# Patient Record
Sex: MALE | Race: WHITE | NOT HISPANIC OR LATINO | Employment: UNEMPLOYED | URBAN - METROPOLITAN AREA
[De-identification: names, ages, dates, MRNs, and addresses within clinical notes are randomized per-mention and may not be internally consistent; named-entity substitution may affect disease eponyms.]

---

## 2018-05-06 ENCOUNTER — HOSPITAL ENCOUNTER (INPATIENT)
Facility: HOSPITAL | Age: 18
LOS: 3 days | Discharge: HOME | DRG: 885 | End: 2018-05-09
Attending: PSYCHIATRY & NEUROLOGY | Admitting: PSYCHIATRY & NEUROLOGY
Payer: MEDICAID

## 2018-05-06 DIAGNOSIS — F31.32 BIPOLAR AFFECTIVE DISORDER, CURRENTLY DEPRESSED, MODERATE: Primary | Chronic | ICD-10-CM

## 2018-05-06 DIAGNOSIS — R45.851 DEPRESSION WITH SUICIDAL IDEATION: ICD-10-CM

## 2018-05-06 DIAGNOSIS — F32.A DEPRESSION WITH SUICIDAL IDEATION: ICD-10-CM

## 2018-05-06 DIAGNOSIS — F32.A DEPRESSION, UNSPECIFIED DEPRESSION TYPE: ICD-10-CM

## 2018-05-06 LAB
CHOLEST SERPL-MCNC: 160 MG/DL
CHOLEST/HDLC SERPL: 4.7 {RATIO}
GLUCOSE SERPL-MCNC: 89 MG/DL
HDLC SERPL-MCNC: 34 MG/DL
HDLC SERPL: 21.3 %
LDLC SERPL CALC-MCNC: 94.4 MG/DL
NONHDLC SERPL-MCNC: 126 MG/DL
TRIGL SERPL-MCNC: 158 MG/DL

## 2018-05-06 PROCEDURE — 99223 1ST HOSP IP/OBS HIGH 75: CPT | Mod: AF,HA,, | Performed by: PSYCHIATRY & NEUROLOGY

## 2018-05-06 PROCEDURE — 99213 OFFICE O/P EST LOW 20 MIN: CPT | Mod: ,,, | Performed by: FAMILY MEDICINE

## 2018-05-06 PROCEDURE — 90833 PSYTX W PT W E/M 30 MIN: CPT | Mod: AF,HA,, | Performed by: PSYCHIATRY & NEUROLOGY

## 2018-05-06 PROCEDURE — 82947 ASSAY GLUCOSE BLOOD QUANT: CPT

## 2018-05-06 PROCEDURE — 11400000 HC PSYCH PRIVATE ROOM

## 2018-05-06 PROCEDURE — 36415 COLL VENOUS BLD VENIPUNCTURE: CPT

## 2018-05-06 PROCEDURE — 80061 LIPID PANEL: CPT

## 2018-05-06 PROCEDURE — 25000003 PHARM REV CODE 250: Performed by: PSYCHIATRY & NEUROLOGY

## 2018-05-06 RX ORDER — TRAZODONE HYDROCHLORIDE 100 MG/1
100 TABLET ORAL NIGHTLY
Status: DISCONTINUED | OUTPATIENT
Start: 2018-05-06 | End: 2018-05-07

## 2018-05-06 RX ORDER — ACETAMINOPHEN 325 MG/1
650 TABLET ORAL EVERY 6 HOURS PRN
Status: DISCONTINUED | OUTPATIENT
Start: 2018-05-06 | End: 2018-05-09 | Stop reason: HOSPADM

## 2018-05-06 RX ORDER — LAMOTRIGINE 25 MG/1
50 TABLET ORAL 2 TIMES DAILY
Status: ON HOLD | COMMUNITY
End: 2018-05-09 | Stop reason: HOSPADM

## 2018-05-06 RX ORDER — OLANZAPINE 10 MG/1
10 TABLET ORAL EVERY 8 HOURS PRN
Status: DISCONTINUED | OUTPATIENT
Start: 2018-05-06 | End: 2018-05-09 | Stop reason: HOSPADM

## 2018-05-06 RX ORDER — RISPERIDONE 1 MG/1
1.5 TABLET ORAL NIGHTLY
Status: ON HOLD | COMMUNITY
End: 2018-05-09 | Stop reason: HOSPADM

## 2018-05-06 RX ORDER — LOPERAMIDE HYDROCHLORIDE 2 MG/1
2 CAPSULE ORAL
Status: DISCONTINUED | OUTPATIENT
Start: 2018-05-06 | End: 2018-05-09 | Stop reason: HOSPADM

## 2018-05-06 RX ORDER — DOCUSATE SODIUM 100 MG/1
100 CAPSULE, LIQUID FILLED ORAL DAILY PRN
Status: DISCONTINUED | OUTPATIENT
Start: 2018-05-06 | End: 2018-05-09 | Stop reason: HOSPADM

## 2018-05-06 RX ORDER — FOLIC ACID 1 MG/1
1 TABLET ORAL DAILY
Status: DISCONTINUED | OUTPATIENT
Start: 2018-05-06 | End: 2018-05-09 | Stop reason: HOSPADM

## 2018-05-06 RX ORDER — HYDROXYZINE PAMOATE 50 MG/1
50 CAPSULE ORAL NIGHTLY PRN
Status: DISCONTINUED | OUTPATIENT
Start: 2018-05-06 | End: 2018-05-09 | Stop reason: HOSPADM

## 2018-05-06 RX ORDER — OLANZAPINE 10 MG/2ML
10 INJECTION, POWDER, FOR SOLUTION INTRAMUSCULAR EVERY 8 HOURS PRN
Status: DISCONTINUED | OUTPATIENT
Start: 2018-05-06 | End: 2018-05-09 | Stop reason: HOSPADM

## 2018-05-06 RX ORDER — LAMOTRIGINE 100 MG/1
100 TABLET ORAL DAILY
Status: DISCONTINUED | OUTPATIENT
Start: 2018-05-06 | End: 2018-05-09 | Stop reason: HOSPADM

## 2018-05-06 RX ORDER — RISPERIDONE 2 MG/1
2 TABLET ORAL NIGHTLY
Status: DISCONTINUED | OUTPATIENT
Start: 2018-05-06 | End: 2018-05-08

## 2018-05-06 RX ORDER — MAG HYDROX/ALUMINUM HYD/SIMETH 200-200-20
30 SUSPENSION, ORAL (FINAL DOSE FORM) ORAL EVERY 6 HOURS PRN
Status: DISCONTINUED | OUTPATIENT
Start: 2018-05-06 | End: 2018-05-09 | Stop reason: HOSPADM

## 2018-05-06 RX ORDER — ESCITALOPRAM OXALATE 10 MG/1
10 TABLET ORAL DAILY
Status: DISCONTINUED | OUTPATIENT
Start: 2018-05-06 | End: 2018-05-09 | Stop reason: HOSPADM

## 2018-05-06 RX ORDER — TRAZODONE HYDROCHLORIDE 50 MG/1
50 TABLET ORAL NIGHTLY
Status: ON HOLD | COMMUNITY
End: 2018-05-09 | Stop reason: HOSPADM

## 2018-05-06 RX ADMIN — HYDROXYZINE PAMOATE 50 MG: 50 CAPSULE ORAL at 01:05

## 2018-05-06 RX ADMIN — FOLIC ACID 1 MG: 1 TABLET ORAL at 08:05

## 2018-05-06 RX ADMIN — THERA TABS 1 TABLET: TAB at 08:05

## 2018-05-06 RX ADMIN — LAMOTRIGINE 100 MG: 100 TABLET ORAL at 11:05

## 2018-05-06 RX ADMIN — RISPERIDONE 2 MG: 2 TABLET ORAL at 08:05

## 2018-05-06 RX ADMIN — TRAZODONE HYDROCHLORIDE 100 MG: 100 TABLET ORAL at 08:05

## 2018-05-06 RX ADMIN — ESCITALOPRAM 10 MG: 10 TABLET, FILM COATED ORAL at 11:05

## 2018-05-06 NOTE — H&P
[facility] Medicine  History & Physical    Patient Name: Caio Villa  MRN: [id number]  Admission Date: 5/6/2018  Attending Physician: Neal Jackson MD   Primary Care Provider: Dimas Garcia MD         Patient information was obtained from patient and ER records.     History & Physical      SUBJECTIVE:     History of Present Illness:  Patient is a 17 y.o. male presents with depression and suicidal ideations. Admitted to [facility].    No past medical history other than psych. No complaints today.    PTA Medications   Medication Sig    lamoTRIgine (LAMICTAL) 25 MG tablet Take 50 mg by mouth 2 (two) times daily.    risperiDONE (RISPERDAL) 1 MG tablet Take 1.5 mg by mouth every evening.    traZODone (DESYREL) 50 MG tablet Take 50 mg by mouth every evening.       Review of patient's allergies indicates:  No Known Allergies    Past Medical History:   Diagnosis Date    Anxiety     Bipolar disorder     Depression     History of psychiatric hospitalization     Hx of psychiatric care     Psychiatric problem     Suicide attempt     Therapy      No past surgical history on file.  Family History   Problem Relation Age of Onset    Drug abuse Mother     Drug abuse Father      Social History   Substance Use Topics    Smoking status: Never Smoker    Smokeless tobacco: Never Used    Alcohol use No        Review of Systems:  Constitutional: no fever or chills  Respiratory: no cough or shorness of breath  Cardiovascular: no chest pain or palpitations  Gastrointestinal: no nausea or vomiting, no abdominal pain or change in bowel habits  Musculoskeletal: no arthralgias or myalgias  Psych: depressed  OBJECTIVE:     Vital Signs (Most Recent)  Temp: 96.8 °F (36 °C) (05/06/18 0759)  Pulse: 77 (05/06/18 0759)  Resp: 16 (05/06/18 0759)  BP: (!) 103/55 (05/06/18 0759)    Physical Exam:  General: well developed, well nourished  Lungs:  clear to auscultation bilaterally and normal respiratory  effort  Cardiovascular: Heart: regular rate and rhythm, S1, S2 normal, no murmur, click, rub or gallop. Chest Wall: no tenderness. Extremities: no cyanosis or edema, or clubbing. Pulses: 2+ and symmetric.  Abdomen/Rectal: Abdomen: soft, non-tender non-distented; bowel sounds normal; no masses,  no organomegaly. Rectal: not examined  Skin: Skin color, texture, turgor normal. No rashes or lesions  Musculoskeletal:normal gait  Psych: very flat affect. Will not make eye contact  Neuro: Cranial nerves:  CN II Visual fields full to confrontation.   CN III, IV, VI Pupils are equal, round, and reactive to light.  CN III: no palsy  Nystagmus: none   Diplopia: none  Ophthalmoparesis: none  CN V Facial sensation intact.   CN VII Facial expression full, symmetric.   CN VIII normal.   CN IX normal.   CN X normal.   CN XI normal.   CN XII normal.        Laboratory  CBC: No results for input(s): WBC, RBC, HGB, HCT, PLT, MCV, MCH, MCHC in the last 168 hours.  CMP: No results for input(s): GLU, CALCIUM, ALBUMIN, PROT, NA, K, CO2, CL, BUN, CREATININE, ALKPHOS, ALT, AST, BILITOT in the last 168 hours.  No results for input(s): COLORU, CLARITYU, SPECGRAV, PHUR, PROTEINUA, GLUCOSEU, BILIRUBINCON, BLOODU, WBCU, RBCU, BACTERIA, MUCUS, NITRITE, LEUKOCYTESUR, UROBILINOGEN, HYALINECASTS in the last 168 hours.  No results found for: TSH  No results found for this or any previous visit.  No results found for: ALCOHOLMEDIC  No results found for: LABACET  No results found for this or any previous visit.  No results found for this or any previous visit.  No results found for: PREGTESTUR    ASSESSMENT/PLAN:     Active Hospital Problems    Diagnosis  POA    Depression with suicidal ideation [F32.9, R45.851]  Not Applicable      Resolved Hospital Problems    Diagnosis Date Resolved POA   No resolved problems to display.       Plan: Further orders for psych      Assessment/Plan:     Depression with suicidal ideation    Orders per psyche             VTE Risk Mitigation     None             Layla Crawley MD  Department of Hospital Medicine   [facility]

## 2018-05-06 NOTE — PLAN OF CARE
Problem: Patient Care Overview (Adult)  Goal: Plan of Care Review  Outcome: Ongoing (interventions implemented as appropriate)  Pt isolated to room most of shift, but out for snacks and meals. VSS. Pt voices depression, but denies SI/HI, AH/VH. Medication compliant. Pt is quiet and soft spoken. Will continue to monitor pt.
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
## 2018-05-06 NOTE — PLAN OF CARE
Problem: Overarching Goals (Adult)  Goal: Optimized Coping Skills in Response to Life Stressors    Intervention: Promote Effective Coping Strategies  Patient missed group psychotherapy because he was meeting with his psychiatrist at the time.
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
## 2018-05-07 PROCEDURE — 99233 SBSQ HOSP IP/OBS HIGH 50: CPT | Mod: AF,HA,, | Performed by: PSYCHIATRY & NEUROLOGY

## 2018-05-07 PROCEDURE — 25000003 PHARM REV CODE 250: Performed by: PSYCHIATRY & NEUROLOGY

## 2018-05-07 PROCEDURE — 11400000 HC PSYCH PRIVATE ROOM

## 2018-05-07 PROCEDURE — 90833 PSYTX W PT W E/M 30 MIN: CPT | Mod: AF,HA,, | Performed by: PSYCHIATRY & NEUROLOGY

## 2018-05-07 RX ORDER — TRAZODONE HYDROCHLORIDE 150 MG/1
150 TABLET ORAL NIGHTLY
Status: DISCONTINUED | OUTPATIENT
Start: 2018-05-07 | End: 2018-05-09 | Stop reason: HOSPADM

## 2018-05-07 RX ADMIN — LAMOTRIGINE 100 MG: 100 TABLET ORAL at 08:05

## 2018-05-07 RX ADMIN — TRAZODONE HYDROCHLORIDE 150 MG: 150 TABLET ORAL at 08:05

## 2018-05-07 RX ADMIN — RISPERIDONE 2 MG: 2 TABLET ORAL at 08:05

## 2018-05-07 RX ADMIN — THERA TABS 1 TABLET: TAB at 08:05

## 2018-05-07 RX ADMIN — ESCITALOPRAM 10 MG: 10 TABLET, FILM COATED ORAL at 08:05

## 2018-05-07 RX ADMIN — FOLIC ACID 1 MG: 1 TABLET ORAL at 08:05

## 2018-05-07 NOTE — PLAN OF CARE
"Problem: Patient Care Overview (Adult)  Goal: Interdisciplinary Rounds/Family Conf  TREATMENT TEAM   Chief Complaint:  Caio Villa is a 17 y.o. male with a past psychiatric history of Bipolar 1 with psychotic features, MONA, currently admitted to the inpatient unit with the following chief complaint: "I just kind of had a breakdown"    Current:  patient attended Treatment Team dressed in personal clothes. Unkept hair. Very soft spoken. Feel like I was really stressed out and had a breakdown.  States he was upset and wanted to be left alone and argued with boyfriend they have been together 6 months.  Patient states his suicidal thoughts are getting better (I"d rather be dead than be like this). Reports previous attempt (Tried to smother myself with a pillow). States he was hearing a voice giving orders.  Has not heard any since he's been in the hospitalsital. Reports cutting when he was younger. Last timea few months ago. State he was stressed out with work. States he has had ness before where he hasn't slept for days.   States he has had problems falling asleep. Medications: Trazadone,     Plan:  D/C to home   [facility] (states he has been to the mental health center there).           "

## 2018-05-07 NOTE — PLAN OF CARE
Problem: Patient Care Overview (Adult)  Goal: Plan of Care Review  Outcome: Ongoing (interventions implemented as appropriate)  Shift note : patient is cooperative . He is unkempt . He states that he is depressed and stressed out . He and boyfriend had a fight and broke up . He is attending all groups and is taking all ordered medications .

## 2018-05-07 NOTE — PLAN OF CARE
"Problem: Patient Care Overview (Adult)  Goal: Plan of Care Review  Outcome: Ongoing (interventions implemented as appropriate)  Pt has been out of room sitting by self in dayroom reading.  Quiet, blunted, low voice.  No behavior problems.  Denies SI/HI/hallucinations.  Rates depression 2/10 and anxiety 4/10.  Oriented x 4.  States he talked to his boyfriend today and he will not be returning to live there.  He plans on moving in with his sister.  He talked on phone to his mother.  States his mood is "fine."  Remains guarded with minimal interaction.  Pt says he feels ready for discharge.  Will continue to monitor for safety      "

## 2018-05-07 NOTE — PROGRESS NOTES
"PSYCHIATRY DAILY INPATIENT PROGRESS NOTE  SUBSEQUENT HOSPITAL VISIT    ENCOUNTER DATE: 5/7/2018  SITE: [facility]    DATE OF ADMISSION: 5/6/2018 12:51 AM  LENGTH OF STAY: 1 days      HISTORY    CHIEF COMPLAINT   Caio Villa is a 17 y.o. male, seen during daily [place] rounds on the inpatient unit.  Caio Villa presents with the chief complaint of  "I just kind of had a breakdown"    HPI   (Elements: Location, Quality, Severity, Duration, Timing, Content, Modifying Factors, Associated Signs & Symptoms)    The patient was seen and examined. The chart was reviewed.    Staff reports no behavioral or management issues.     The patient has been compliant with treatment. The patient denied any side effects.    Continued Symptoms of Depression: +diminished mood, +loss of interest/anhedonia; +irritability, no diminished energy, +change in sleep, no change in appetite, no diminished concentration or cognition or indecisiveness, no PMA, +PMR, +excessive guilt or hopelessness or worthlessness, +suicidal ideations     Less trouble with Sleep: less trouble with initiation/maintenance, no early morning awakening with inability to return to sleep; Sleeping about 8.5 hours last night     Continued Suicidal/Homicidal ideations: less active/passive ideations, no organized plans, no future intentions; +history of cutting (non-suicidal self-harm) when stressed)     Denied Symptoms of psychosis: no hallucinations, delusions, disorganized thinking, disorganized behavior or abnormal motor behavior, or negative symptoms; no AH in the last 24 hours; his recent AH were likely "micro-psychotic in nature" and only occur when stressed      Denied current Symptoms of ness or hypomania: no elevated, expansive, or irritable mood with no increased energy or activity; with no inflated self-esteem or grandiosity, decreased need for sleep, increased rate of speech, FOI or racing thoughts, distractibility, increased goal directed activity or " PMA, or risky/disinhibited behavior; +h/o chronic mood lability (cluster B spectrum/Borderline Personality Traits)- he does also have a history of being diagnosed with Bipolar Disorder- Patient previously explained that he has had past manic episodes of ness that last for a week or longer. During this period he reportedly has increased goal directed activities, gets very irritable, and will party more.  He will also get rapid speech and distractibility.        Continued but less Symptoms of MONA: less excessive anxiety/worry/fear, with less restlessness, fatigue, poor concentration, irritability, muscle tension, and sleep disturbance      Decreasing Symptoms of Panic Disorder: no panic attacks since admission;  less agoraphobia     Some Symptoms of PTSD: +h/o trauma; no re-experiencing/intrusive symptoms, less avoidant behavior, less negative alterations in cognition or mood, less hyperarousal symptoms; without dissociative symptoms     PSYCHOTHERAPY ADD-ON +[id number]   30 (16-37*) minutes    Time: 18 minutes  Participants: Met with patient    Therapeutic Intervention Type: behavior modifying psychotherapy, supportive psychotherapy  Why chosen therapy is appropriate versus another modality: relevant to diagnosis, patient responds to this modality, evidence based practice    Target symptoms: depression, anxiety   Primary focus: mood, psychosocial stressors  Psychotherapeutic techniques: supportive, behavioral and cognitive techniques; psycho-education; setting tx goals    Outcome monitoring methods: self-report, observation    Patient's response to intervention:  The patient's response to intervention is accepting.    Progress toward goals:  The patient's progress toward goals is fair .          ROS  General ROS: negative  Ophthalmic ROS: negative  ENT ROS: negative  Allergy and Immunology ROS: negative  Hematological and Lymphatic ROS: negative  Endocrine ROS: negative  Respiratory ROS: no cough, shortness of breath, or  "wheezing  Cardiovascular ROS: no chest pain or dyspnea on exertion  Gastrointestinal ROS: no abdominal pain, change in bowel habits, or black or bloody stools  Genito-Urinary ROS: no dysuria, trouble voiding, or hematuria  Musculoskeletal ROS: negative  Neurological ROS: no TIA or stroke symptoms  Dermatological ROS: negative    PAST MEDICAL HISTORY   Past Medical History:   Diagnosis Date    Anxiety     Bipolar disorder     Depression     History of psychiatric hospitalization     Hx of psychiatric care     Psychiatric problem     Suicide attempt     Therapy            PSYCHOTROPIC MEDICATIONS   Scheduled Meds:   escitalopram oxalate  10 mg Oral Daily    folic acid  1 mg Oral Daily    lamoTRIgine  100 mg Oral Daily    multivitamin  1 tablet Oral Daily    risperiDONE  2 mg Oral QHS    traZODone  100 mg Oral QHS     Continuous Infusions:  PRN Meds:.acetaminophen, aluminum-magnesium hydroxide-simethicone, docusate sodium, hydrOXYzine pamoate, loperamide, OLANZapine **AND** OLANZapine        EXAMINATION    VITALS   Vitals:    05/06/18 1956   BP: (!) 115/57   Pulse: 69   Resp: 18   Temp: 99.2 °F (37.3 °C)       CONSTITUTIONAL  General Appearance: Male, in casual attire ; NAD     MUSCULOSKELETAL  Muscle Strength and Tone:  normal  Abnormal Involuntary Movements:  none  Gait and Station:  normal; non-ataxic     PSYCHIATRIC   Level of Consciousness: awake, alert  Orientation: p/p/t/s  Grooming:  adequate to circumstances  Psychomotor Behavior:  no PMA, +PMR  Speech: nl r/t/v/s  Language:  English fluent  Mood: "tired"  Affect: mood congruent, blunted, dysphoric  Thought Process:  linear and organized  Associations:  intact; no EVAN  Thought Content: +SI, no AH, denied VH/delusions; denied HI  Memory:  intact to recent and remote events  Attention:  intact to conversation; not distractible   Fund of Knowledge:  age and education appropriate  Estimate if Intelligence:  average based on work/education history, " vocabulary and mental status exam  Insight:  good- seeks help  Judgment:   good- no bx issues, compliant and cooperative      DIAGNOSTIC TESTING   Laboratory Results  No results found for this or any previous visit (from the past 24 hour(s)).      ASSESSMENT      IMPRESSION   Bipolar Disorder MRE Depressed with psychotic features  MONA  Panic Disorder with agoraphobia  Personality Disorder     Psychosocial stress        MEDICAL DECISION MAKING        PROBLEM LIST AND MANAGEMENT PLANS     Mood - counseling, Lamictal, lexapro, trazodone  Anxiety - counseling, lexapro  Psychosis - counseling, risperdal  Psychosocial stress - social work to assist with discharge stabilization including [place] [facility] [place]       PRESCRIPTION DRUG MANAGEMENT  Compliance: yes  Side Effects: no  Regimen Adjustments:      5/6  Risperdal 2mg QHS  Lamictal 100mg QDay  Lexapro 10mg QDay  Trazodone 100mg QHS    5/7  Increase Trazodone to 150 mg po q HS     DIAGNOSTIC TESTING  Labs reviewed     Disposition:  - to assist with aftercare planning and collateral  -Once stable discharge home with outpatient follow up care and/or rehab  -Continue inpatient treatment under a PEC and/or CEC for danger to self as evident by suicidal ideation in the setting of bipolar depression with psychotic features    NEED FOR CONTINUED HOSPITALIZATION  Psychiatric illness continues to pose a potential threat to life or bodily function, of self or others, thereby requiring the need for continued inpatient psychiatric hospitalization: Yes    Protective inpatient pyschiatric hospitalization required while a safe disposition plan is enacted: Yes    Patient stabilized and ready for discharge from inpatient psychiatric unit: No      STAFF:   Ajay Lewis MD  Psychiatry

## 2018-05-07 NOTE — PLAN OF CARE
"Problem: Overarching Goals (Adult)  Goal: Optimized Coping Skills in Response to Life Stressors    Intervention: Promote Effective Coping Strategies  Group Note    Behavior: Patient attended Psychotherapy Group and participated. Patient  Was quiet for much of the time, but did respond when questioned directly.     Intervention:  Words of Millen - Patients shared and discussed the meaning of wisdom sayings and how it could apply to their lives.     Response:  Patient noted Where there's a will there's a way is the words of wisdom that appeals to him. Patient noted "you can't always wait for stuff to happen." Discussed self care.     Plan:  Will continue to encourage patient participation in group. Will meet 1:1 with patient later.         "

## 2018-05-08 PROBLEM — F31.32 BIPOLAR AFFECTIVE DISORDER, CURRENTLY DEPRESSED, MODERATE: Chronic | Status: ACTIVE | Noted: 2018-05-06

## 2018-05-08 PROCEDURE — 11400000 HC PSYCH PRIVATE ROOM

## 2018-05-08 PROCEDURE — 99233 SBSQ HOSP IP/OBS HIGH 50: CPT | Mod: AF,HA,, | Performed by: PSYCHIATRY & NEUROLOGY

## 2018-05-08 PROCEDURE — 25000003 PHARM REV CODE 250: Performed by: PSYCHIATRY & NEUROLOGY

## 2018-05-08 PROCEDURE — 90833 PSYTX W PT W E/M 30 MIN: CPT | Mod: AF,HA,, | Performed by: PSYCHIATRY & NEUROLOGY

## 2018-05-08 RX ADMIN — LAMOTRIGINE 100 MG: 100 TABLET ORAL at 09:05

## 2018-05-08 RX ADMIN — THERA TABS 1 TABLET: TAB at 09:05

## 2018-05-08 RX ADMIN — FOLIC ACID 1 MG: 1 TABLET ORAL at 09:05

## 2018-05-08 RX ADMIN — TRAZODONE HYDROCHLORIDE 150 MG: 150 TABLET ORAL at 09:05

## 2018-05-08 RX ADMIN — ESCITALOPRAM 10 MG: 10 TABLET, FILM COATED ORAL at 09:05

## 2018-05-08 NOTE — PROGRESS NOTES
"PSYCHIATRY DAILY INPATIENT PROGRESS NOTE  SUBSEQUENT HOSPITAL VISIT    ENCOUNTER DATE: 5/8/2018  SITE: [facility]    DATE OF ADMISSION: 5/6/2018 12:51 AM  LENGTH OF STAY: 2 days      HISTORY    CHIEF COMPLAINT   Caio Villa is a 17 y.o. male, seen during daily [place] rounds on the inpatient unit.  Caio Villa presents with the chief complaint of  "I just kind of had a breakdown"    HPI   (Elements: Location, Quality, Severity, Duration, Timing, Content, Modifying Factors, Associated Signs & Symptoms)    The patient was seen and examined. The chart was reviewed.    Staff reports no behavioral or management issues.     The patient has been compliant with treatment. The patient denied any side effects.    Continued but improving Symptoms of Depression: less diminished mood, improving loss of interest/anhedonia; resolved irritability, no diminished energy, +change in sleep, no change in appetite, no diminished concentration or cognition or indecisiveness, no PMA, +PMR, +excessive guilt or hopelessness or worthlessness, no suicidal ideations     Improved trouble with Sleep: less trouble with initiation/maintenance, no early morning awakening with inability to return to sleep; Sleeping about 8.5 hours last night     Improving Suicidal/Homicidal ideations: no active/passive ideations, no organized plans, no future intentions; +history of cutting (non-suicidal self-harm) when stressed)     Denied Symptoms of psychosis: no hallucinations, delusions, disorganized thinking, disorganized behavior or abnormal motor behavior, or negative symptoms; no AH in the last 24 hours; his recent AH were likely "micro-psychotic in nature" and only occur when stressed     We again discussed intrusive thoughts.       Denied current Symptoms of ness or hypomania: no elevated, expansive, or irritable mood with no increased energy or activity; with no inflated self-esteem or grandiosity, decreased need for sleep, increased rate of " speech, FOI or racing thoughts, distractibility, increased goal directed activity or PMA, or risky/disinhibited behavior; +h/o chronic mood lability (cluster B spectrum/Borderline Personality Traits)- he does also have a history of being diagnosed with Bipolar Disorder- Patient previously explained that he has had past manic episodes of ness that last for a week or longer. During this period he reportedly has increased goal directed activities, gets very irritable, and will party more.  He will also get rapid speech and distractibility.        Continued but less Symptoms of MONA: less excessive anxiety/worry/fear, with less restlessness, fatigue, poor concentration, irritability, muscle tension, and sleep disturbance      Decreasing Symptoms of Panic Disorder: no panic attacks since admission;  less agoraphobia     Some Symptoms of PTSD: +h/o trauma; no re-experiencing/intrusive symptoms, less avoidant behavior, less negative alterations in cognition or mood, less hyperarousal symptoms; without dissociative symptoms     Borderline Symptoms: self injury, last in January cut black/white thinking, micropsychosis, brief intense relationships    Patient has looked up bpd before and feels this diagnosis fits him.      PSYCHOTHERAPY ADD-ON +[id number]   30 (16-37*) minutes    Time: 30 minutes  Participants: Met with patient    Therapeutic Intervention Type: behavior modifying psychotherapy, supportive psychotherapy  Why chosen therapy is appropriate versus another modality: relevant to diagnosis, patient responds to this modality, evidence based practice    Target symptoms: depression, anxiety   Primary focus: mood, psychosocial stressors  Psychotherapeutic techniques: supportive, behavioral and cognitive techniques; psycho-education; setting tx goals    Outcome monitoring methods: self-report, observation    Patient's response to intervention:  The patient's response to intervention is accepting.    Progress toward goals:  The  "patient's progress toward goals is fair .    Went over safety plan and discussed borderline personality disorder diagnosis.        ROS  General ROS: negative  Ophthalmic ROS: negative  ENT ROS: negative  Allergy and Immunology ROS: negative  Hematological and Lymphatic ROS: negative  Endocrine ROS: negative  Respiratory ROS: no cough, shortness of breath, or wheezing  Cardiovascular ROS: no chest pain or dyspnea on exertion  Gastrointestinal ROS: no abdominal pain, change in bowel habits, or black or bloody stools  Genito-Urinary ROS: no dysuria, trouble voiding, or hematuria  Musculoskeletal ROS: negative  Neurological ROS: no TIA or stroke symptoms  Dermatological ROS: negative    PAST MEDICAL HISTORY   Past Medical History:   Diagnosis Date    Anxiety     Bipolar disorder     Depression     History of psychiatric hospitalization     Hx of psychiatric care     Psychiatric problem     Suicide attempt     Therapy            PSYCHOTROPIC MEDICATIONS   Scheduled Meds:   escitalopram oxalate  10 mg Oral Daily    folic acid  1 mg Oral Daily    lamoTRIgine  100 mg Oral Daily    multivitamin  1 tablet Oral Daily    risperiDONE  2 mg Oral QHS    traZODone  150 mg Oral QHS     Continuous Infusions:  PRN Meds:.acetaminophen, aluminum-magnesium hydroxide-simethicone, docusate sodium, hydrOXYzine pamoate, loperamide, OLANZapine **AND** OLANZapine        EXAMINATION    VITALS   Vitals:    05/07/18 2038   BP: (!) 121/59   Pulse: 71   Resp: 18   Temp: 98.9 °F (37.2 °C)       CONSTITUTIONAL  General Appearance: Male, in casual attire ; NAD     MUSCULOSKELETAL  Muscle Strength and Tone:  normal  Abnormal Involuntary Movements:  none  Gait and Station:  normal; non-ataxic     PSYCHIATRIC   Level of Consciousness: awake, alert  Orientation: p/p/t/s  Grooming:  adequate to circumstances  Psychomotor Behavior:  no PMA, +PMR  Speech: nl r/t/v/s  Language:  English fluent  Mood: "better"  Affect: mood congruent, less " blunted, less dysphoric  Thought Process:  linear and organized  Associations:  intact; no EVAN  Thought Content: no SI, no AH, denied VH/delusions; denied HI  Memory:  intact to recent and remote events  Attention:  intact to conversation; not distractible   Fund of Knowledge:  age and education appropriate  Estimate if Intelligence:  average based on work/education history, vocabulary and mental status exam  Insight:  good- seeks help  Judgment:   good- no bx issues, compliant and cooperative      DIAGNOSTIC TESTING   Laboratory Results  No results found for this or any previous visit (from the past 24 hour(s)).      ASSESSMENT      IMPRESSION   Bipolar Disorder MRE Depressed with psychotic features  MONA  Panic Disorder with agoraphobia  Borderline Personality Disorder     Psychosocial stress        MEDICAL DECISION MAKING        PROBLEM LIST AND MANAGEMENT PLANS     Mood - counseling, Lamictal, lexapro, trazodone  Anxiety - counseling, lexapro  Psychosis - counseling, risperdal  Psychosocial stress - social work to assist with discharge stabilization [place] [facility] [place]       PRESCRIPTION DRUG MANAGEMENT  Compliance: yes  Side Effects: no  Regimen Adjustments:      5/6  Risperdal 2mg QHS  Lamictal 100mg QDay  Lexapro 10mg QDay  Trazodone 100mg QHS    5/7  Increase Trazodone to 150 mg po q HS    5/8  Discontinue Risperdal.  We discussed risks / benefits of this medication and do not feel it is appropriate      DIAGNOSTIC TESTING  Labs reviewed     Disposition:  -SW to assist with aftercare planning and collateral  -Once stable discharge home with outpatient follow up care and/or rehab  -Continue inpatient treatment under a PEC and/or CEC for danger to self as evident by suicidal ideation in the setting of bipolar depression with psychotic features    Patient likely ready for discharge tomorrow    NEED FOR CONTINUED HOSPITALIZATION  Psychiatric illness continues to pose a potential  threat to life or bodily function, of self or others, thereby requiring the need for continued inpatient psychiatric hospitalization: Yes    Protective inpatient pyschiatric hospitalization required while a safe disposition plan is enacted: Yes    Patient stabilized and ready for discharge from inpatient psychiatric unit: No      STAFF:   Neal Jackson MD  Psychiatry

## 2018-05-08 NOTE — PLAN OF CARE
Problem: Overarching Goals (Adult)  Goal: Optimized Coping Skills in Response to Life Stressors    Intervention: Promote Effective Coping Strategies  Group Note    Behavior:  Patient attended Psychotherapy Group and participated.       Intervention:  Self Portrait - patient's halle their self image, depicting how they felt about themselves. Patient's shared and described their drawing. Discussed the importance and benefits of having a positive self image vs. A negative self image. Patient also listed their positive qualities.     Response:  Patient halle      Plan:  Will continue to encourage patient participation in group.

## 2018-05-08 NOTE — PLAN OF CARE
Problem: Patient Care Overview (Adult)  Goal: Plan of Care Review  Outcome: Ongoing (interventions implemented as appropriate)  Quiet with minimal interaction.  Accepting meals and medications.  Reading in room and dayroom.  Denies suicidal and homicidal ideations.  Denies hallucinations.  Restricted affect.

## 2018-05-08 NOTE — PLAN OF CARE
"  Treatment Recommendation:   1:1 Intervention (as needed)    Cognitive Stimulation Skilled Activity  Sensory Stimulation Skilled Activity  Creative Expression Skilled Activity  Self Expression Skilled Activity  Mild Exercises Skilled Activity  Stress Management Skilled Activity  Coping Skilled Activity  Leisure Education and Awareness Skilled Activity    Treatment Goal(s):  Long Term Goals Refer To Master Treatment Plan    Short Term Treatment Goal(s)  Patient Will:  Exhibit Improvement in Mood  Demonstrate Constructive Expression of Feelings and Behavior  Identify at Least 2 Coping Skills or Leisure Skills to Reduce Depression and Hopelessness Upon Request from Therapist    Discharge Recommendations:  Encourage Patient to Utilize Coping Skills on a Regular Basis to Reduce the Risk of Decompensating and Re-Hospitalizations  Follow Up with After Care Appointments  Continue with Current Leisure Activities     Patient presents with calm, cooperative affect and "good" mood. Patient states his admit is due to "just kind of had a mental breakdown. Feeling stressed after an argument with my boyfriend." Patient denies alcohol or drug use. Patient states he is in a relationship(plans to take a break from the relationship), no children, has high school education, unemployed,wears glasses(states he is legally blind), lived with boyfriend before admit, plan to move with family after discharge. Patient verbalized main goal "see a therapist, get a job, save money, read and write."  "

## 2018-05-08 NOTE — PLAN OF CARE
Problem: Patient Care Overview (Adult)  Goal: Plan of Care Review  Outcome: Ongoing (interventions implemented as appropriate)  Plan of care reviewed with patient, patient agrees with plan. Admits to being depressed, but denies suicidal ideations and homicidal ideations. Accepts meals, snacks, and medication.  Calm and cooperative. Clear pathways and clutter-free environment maintained. Gait steady, no falls. Promoted an individualized safety plan, effective coping strategies, and motivators to improve mood and resolve feelings of depression and suicidal ideations.  Will continue to monitor for safety.

## 2018-05-08 NOTE — PLAN OF CARE
"Problem: Overarching Goals (Adult)  Goal: Develops/Participates in Therapeutic [place] to Support Successful Transition    Intervention: Mutually Develop Transition Plan  Collateral Contact:   Spoke with Patient's sister Apple  [phone].   "Before he got with that boy he was with, he was sad every once in a while, but he was OK.  With the medication he shouldn't have problems. He was just was in a bad situation. It was his first real relationship and it was way too intense, the jealousy and control.   Sister does not think patient is suicidal or would harm himself. "I'm on the outside looking in, but I really think the boy was picking at him, and he felt had no choice but to say that until boy left him alone.    I talked to him last night and he's like a different person, back to himself. He's talking about getting a job, saving some money."  Sister states she will  patient when he is discharged tomorrow.         "

## 2018-05-09 VITALS
RESPIRATION RATE: 18 BRPM | WEIGHT: 235 LBS | TEMPERATURE: 99 F | BODY MASS INDEX: 33.64 KG/M2 | DIASTOLIC BLOOD PRESSURE: 69 MMHG | SYSTOLIC BLOOD PRESSURE: 117 MMHG | HEIGHT: 70 IN | HEART RATE: 74 BPM

## 2018-05-09 PROCEDURE — 25000003 PHARM REV CODE 250: Performed by: PSYCHIATRY & NEUROLOGY

## 2018-05-09 PROCEDURE — 99239 HOSP IP/OBS DSCHRG MGMT >30: CPT | Mod: AF,HA,, | Performed by: PSYCHIATRY & NEUROLOGY

## 2018-05-09 RX ORDER — ESCITALOPRAM OXALATE 10 MG/1
10 TABLET ORAL DAILY
Qty: 30 TABLET | Refills: 0 | Status: SHIPPED | OUTPATIENT
Start: 2018-05-10 | End: 2019-05-10

## 2018-05-09 RX ORDER — TRAZODONE HYDROCHLORIDE 150 MG/1
150 TABLET ORAL NIGHTLY
Qty: 30 TABLET | Refills: 0 | Status: SHIPPED | OUTPATIENT
Start: 2018-05-09 | End: 2019-05-09

## 2018-05-09 RX ORDER — LAMOTRIGINE 100 MG/1
100 TABLET ORAL DAILY
Qty: 30 TABLET | Refills: 0 | Status: SHIPPED | OUTPATIENT
Start: 2018-05-10 | End: 2019-05-10

## 2018-05-09 RX ADMIN — THERA TABS 1 TABLET: TAB at 08:05

## 2018-05-09 RX ADMIN — LAMOTRIGINE 100 MG: 100 TABLET ORAL at 08:05

## 2018-05-09 RX ADMIN — FOLIC ACID 1 MG: 1 TABLET ORAL at 08:05

## 2018-05-09 RX ADMIN — ESCITALOPRAM 10 MG: 10 TABLET, FILM COATED ORAL at 08:05

## 2018-05-09 NOTE — PLAN OF CARE
Problem: Patient Care Overview (Adult)  Goal: Plan of Care Review  Outcome: Ongoing (interventions implemented as appropriate)  Pt out on unit, blunt affect, denies SI or HI, interacting well with staff and peers, speaks softly, appetite good, will continue to monitor

## 2018-05-09 NOTE — PLAN OF CARE
Problem: Patient Care Overview (Adult)  Goal: Discharge Needs Assessment    Patient will discharge to home with his sister in [place]. Patient will FU with [facility]. Will provide patient with information on [facility] ([place]) and [facility] ([place]).

## 2018-05-09 NOTE — PLAN OF CARE
Problem: Patient Care Overview (Adult)  Goal: Plan of Care Review  Outcome: Ongoing (interventions implemented as appropriate)  Shift note: patient is stable for discharge

## 2018-05-09 NOTE — PSYCH
Patient will be following up with [facility] at [address] [place] [address] In [place], [phone].  Appointment is on 5/15/2018 at 1:15 pm.  Patient does not use tobacco products and came in with a negative UDS.  AVS faxed at 11:43 am on 5/9/2018.

## 2018-05-09 NOTE — DISCHARGE SUMMARY
"Discharge Summary  Psychiatry    Admit Date: 5/6/2018    Discharge Date and Time:  05/09/2018 9:26 AM    Attending Physician: Neal Jackson MD     Discharge Provider: Neal Jackson    Reason for Admission:  Depression and suicidal ideation    History of Present Illness:   Patient explains that they were having a yard sale.  His partners grandmother is in the hospital.  He has been hearing a male voice telling him to "do stuff like stand up and turn around".  Patient "lost it" and started yelling different things.  Police were called because of how he was acting and they then called the ambulance.       Current Medication  Risperdal 1.5mg QHS  Lamictal 25mg BID (consistent with taking the medication on since February)  Trazodone 50mg QHS     Endorses Symptoms of Depression: diminished mood or loss of +interest/anhedonia; +irritability, diminished energy, +change in sleep, change in appetite, diminished concentration or cognition or indecisiveness, PMA/ +R, +excessive guilt or hopelessness or worthlessness, +suicidal ideations     Endorses Sleep: +initiation, maintenance, early morning awakening with inability to return to sleep     Sleeping about 6 hours per night     Endorses Suicidal/Homicidal ideations: active/+passive ideations, organized plans, future intentions     Symptoms of psychosis: hallucinations, delusions, disorganized thinking, disorganized behavior or abnormal motor behavior, or negative symptoms (diminshed emotional expression, avolition, anhedonia, alogia, asociality      Symptoms of ness or hypomania: elevated, expansive, or irritable mood with increased energy or activity; with inflated self-esteem or grandiosity, decreased need for sleep, increased rate of speech, FOI or racing thoughts, distractibility, increased goal directed activity or PMA, risky/disinhibited behavior     Patient diagnosed in February with Bipolar Disorder.  Patient explains he has had ness last for a week or " longer.  He has increased goal directed activities, gets very irritable, and will party more.  He will also get rapid speech and distractibility.       Endorses Symptoms of MONA: +excessive anxiety/worry/fear, more days than not, +about numerous issues, +difficult to control, +with restlessness, fatigue, poor concentration, +irritability, muscle tension, +sleep disturbance; +causes functionally impairing distress      Endorses Symptoms of Panic Disorder: +recurrent panic attacks, precipitated or un-precipitated, +source of worry and/or behavioral changes secondary; +with or without agoraphobia     Lasts for 5-10 minutes and occur about 1-2 times per month.         Endorses Symptoms of PTSD: h/o trauma; re-experiencing/intrusive symptoms, avoidant behavior, negative alterations in cognition or mood, or hyperarousal symptoms; with or without dissociative symptoms      Symptoms of OCD: obsessions or compulsions      Symptoms of Eating Disorders: anorexia, bulimia or binging     Denies Substance Use: intoxication, withdrawal, tolerance, used in larger amounts or duration than intended, unsuccessful attempts to limit or quit, increased time engaging in or seeking out, cravings or strong desire to use, failure to fulfill obligations, negative consequences in social/interpersonal/occupational,/recreational areas, use in dangerous situations, medical or psychological consequences     Procedures Performed: * No surgery found *    Hospital Course (synopsis of major diagnoses, care, treatment, and services provided during the course of the hospital stay):   The patient was stabilized and discharged on the following medications:  Lexapro 10mg QDay Bipolar Disorder MRE Depressed with psychotic features  Lamictal 100mg QDay Bipolar Disorder MRE Depressed with psychotic features  Trazodone 150mg QHS  Bipolar Disorder MRE Depressed with psychotic features    The patient was compliant with treatment. The patient denied any side  "effects.     Improved Symptoms of Depression: improved diminished mood, improving loss of interest/anhedonia; resolved irritability, no diminished energy, improved change in sleep, no change in appetite, no diminished concentration or cognition or indecisiveness, no PMA, less PMR, less excessive guilt or hopelessness or worthlessness, no suicidal ideations     Improved trouble with Sleep: less trouble with initiation/maintenance, no early morning awakening with inability to return to sleep; Sleeping about 6.5 hours last night     Improved / Resolved Suicidal/Homicidal ideations: no active/passive ideations, no organized plans, no future intentions; +history of cutting (non-suicidal self-harm) when stressed)     Denied Symptoms of psychosis: no hallucinations, delusions, disorganized thinking, disorganized behavior or abnormal motor behavior, or negative symptoms; no AH in the last 24 hours; his recent AH were likely "micro-psychotic in nature" and only occur when stressed      We again discussed intrusive thoughts.  These have resolved, and have not worsened with risperdal discontinuation.       Denied current Symptoms of ness or hypomania: no elevated, expansive, or irritable mood with no increased energy or activity; with no inflated self-esteem or grandiosity, decreased need for sleep, increased rate of speech, FOI or racing thoughts, distractibility, increased goal directed activity or PMA, or risky/disinhibited behavior; +h/o chronic mood lability (cluster B spectrum/Borderline Personality Traits)- he does also have a history of being diagnosed with Bipolar Disorder- Patient previously explained that he has had past manic episodes of ness that last for a week or longer. During this period he reportedly has increased goal directed activities, gets very irritable, and will party more.  He will also get rapid speech and distractibility.        Improved Symptoms of MONA: less excessive anxiety/worry/fear, with " less restlessness, fatigue, poor concentration, irritability, muscle tension, and sleep disturbance      Decreasing Symptoms of Panic Disorder: no panic attacks since admission;  less agoraphobia     Some Symptoms of PTSD: +h/o trauma; no re-experiencing/intrusive symptoms, less avoidant behavior, less negative alterations in cognition or mood, less hyperarousal symptoms; without dissociative symptoms      Borderline Symptoms: self injury, last in January cut black/white thinking, micropsychosis, brief intense relationships     Patient has looked up bpd before and feels this diagnosis fits him.      Discussed diagnosis, risks and benefits of proposed treatment vs alternative treatments vs no treatment, and potential side effects of these treatments.  The patient expresses understanding of the above and displays the capacity to agree with this treatment given said understanding.  Patient also agrees that, currently, the benefits outweigh the risks and would like to pursue treatment at this time.    MSE: stated age, casually dressed, well groomed.  No psychomotor agitation or retardation.  No abnormal involuntary movements.  Gait normal.  Speech normal, conversational.  Language fluent English. Mood fine.  Affect normal range, pleasant, euthymic.  Thought process linear.  Associations intact.  Denies suicidal or homicidal ideation.  Denies auditory hallucinations, paranoid ideation, ideas of reference.  Memory intact.  Attention intact.  Fund of knowledge intact.  Insight intact.  Judgment intact.  Alert and oriented to person, place, time.      Tobacco Usage:  Is patient a smoker? No  Does patient want prescription for Tobacco Cessation? No  Does patient want counseling for Tobacco Cessation? No    If patient would like to quit, then over the counter nicotine patch could be used. The patient could also follow up with his PCP or psychiatric provider for other alternatives.     Final Diagnoses:    Principal Problem:  Bipolar Disorder MRE Depressed with psychotic features   Secondary Diagnoses:   MONA  Panic Disorder with agoraphobia  Borderline Personality Disorder     Psychosocial stress    Labs:  Admission on 05/06/2018   Component Date Value Ref Range Status    Cholesterol 05/06/2018 160  120 - 199 mg/dL Final    Triglycerides 05/06/2018 158* 30 - 150 mg/dL Final    HDL 05/06/2018 34* 40 - 75 mg/dL Final    LDL Cholesterol 05/06/2018 94.4  63.0 - 159.0 mg/dL Final    HDL/Chol Ratio 05/06/2018 21.3  20.0 - 50.0 % Final    Total Cholesterol/HDL Ratio 05/06/2018 4.7  2.0 - 5.0 Final    Non-HDL Cholesterol 05/06/2018 126  mg/dL Final    Glucose, Fasting 05/06/2018 89  70 - 110 mg/dL Final         Discharged Condition: stable and improved; not currently a danger to self/others or gravely disabled    Disposition: Home or Self Care    Is patient being discharged on multiple neuroleptics? No    Follow Up/Patient Instructions:     Medications:  Reconciled Home Medications:      Medication List      START taking these medications    escitalopram oxalate 10 MG tablet  Commonly known as:  LEXAPRO  Take 1 tablet (10 mg total) by mouth once daily.  Start taking on:  5/10/2018        CHANGE how you take these medications    lamoTRIgine 100 MG tablet  Commonly known as:  LAMICTAL  Take 1 tablet (100 mg total) by mouth once daily.  Start taking on:  5/10/2018  What changed:  · medication strength  · how much to take  · when to take this     traZODone 150 MG tablet  Commonly known as:  DESYREL  Take 1 tablet (150 mg total) by mouth every evening.  What changed:  · medication strength  · how much to take        STOP taking these medications    risperiDONE 1 MG tablet  Commonly known as:  RISPERDAL          No discharge procedures on file.  Follow-up Information     [facility].    Specialties:  Behavioral Health, Psychiatry, Psychology  Why:  Outpatient Psych Services, as scheduled  Contact information:  [id number]  [facility] [id number]  [phone]                     Diet: regular     Activity as tolerated    Total time spent discharging patient: 35 minutes    Neal Jackson MD  Psychiatry

## 2018-05-09 NOTE — PLAN OF CARE
Problem: Patient Care Overview (Adult)  Goal: Plan of Care Review  Outcome: Ongoing (interventions implemented as appropriate)  Lying quiet in bed, eyes closed, respiration even and unlabored, appearing asleep.  Slept well most all shift except once awakened for about 1 hour but stayed lying in bed without complaint. .  Safety and precautions maintained with rounds every 15 minutes, bed is fixed in a low position and pathways kept clear.  No fall occurred.

## 2018-05-09 NOTE — NURSING
Discharge note : patient is cooperative . avs reviewed with patient and patient expresses understanding . Patient denies suicidal , homicidal ideations and is not gravely disabled . Patient does not use tobacco products . Family will pick him up . And take him home .